# Patient Record
Sex: FEMALE | Race: WHITE
[De-identification: names, ages, dates, MRNs, and addresses within clinical notes are randomized per-mention and may not be internally consistent; named-entity substitution may affect disease eponyms.]

---

## 2019-01-22 ENCOUNTER — HOSPITAL ENCOUNTER (OUTPATIENT)
Dept: HOSPITAL 53 - M SMT | Age: 29
End: 2019-01-22
Attending: INTERNAL MEDICINE

## 2019-01-22 DIAGNOSIS — Z00.00: Primary | ICD-10-CM

## 2019-01-23 NOTE — REP
Clinical:  Nontraumatic hip pain.

 

Technique:  Frontal view of the pelvis with neutral and frog lateral views of the

right and left hips.

 

Findings:

Evidence of prior laminectomy and posterior fixation at L5-S1.  Osseous

structures and joint spaces are intact and normal.  Hip joints appear symmetric

and normal.  No acute fracture dislocation.  No significant degenerative or

congenital abnormalities are appreciated.  Surrounding soft tissues are

unremarkable.

 

Impression:

Essentially normal examination.  Normal appearance of the bilateral hips.

 

 

Electronically Signed by

Braden Mcintyre MD 01/23/2019 06:01 A